# Patient Record
Sex: FEMALE | Race: BLACK OR AFRICAN AMERICAN | Employment: FULL TIME | URBAN - METROPOLITAN AREA
[De-identification: names, ages, dates, MRNs, and addresses within clinical notes are randomized per-mention and may not be internally consistent; named-entity substitution may affect disease eponyms.]

---

## 2021-02-05 ENCOUNTER — NURSE ONLY (OUTPATIENT)
Dept: PRIMARY CARE CLINIC | Age: 51
End: 2021-02-05
Payer: COMMERCIAL

## 2021-02-05 DIAGNOSIS — Z01.818 PREOP EXAMINATION: Primary | ICD-10-CM

## 2021-02-05 PROCEDURE — 99211 OFF/OP EST MAY X REQ PHY/QHP: CPT | Performed by: NURSE PRACTITIONER

## 2021-02-06 LAB — SARS-COV-2: NOT DETECTED

## 2021-02-08 RX ORDER — ATORVASTATIN CALCIUM 40 MG/1
40 TABLET, FILM COATED ORAL DAILY
COMMUNITY

## 2021-02-08 RX ORDER — DULAGLUTIDE 3 MG/.5ML
3 INJECTION, SOLUTION SUBCUTANEOUS WEEKLY
COMMUNITY

## 2021-02-08 SDOH — HEALTH STABILITY: MENTAL HEALTH: HOW OFTEN DO YOU HAVE A DRINK CONTAINING ALCOHOL?: NEVER

## 2021-02-08 NOTE — PROGRESS NOTES
ENDOSCOPY PREOP PHONE INTERVIEW  INSTRUCTIONS:  ? Covid test was 2/5. Please continue to quarantine until your procedure   ? Follow all instructions / preps given to you from your doctor's office. If you have not received these instructions yet, please call the office immediately. ? Enter the MAIN entrance located on [address] and report to the desk on left side of the lobby. Arrival Time: 0700 for your procedure scheduled at: 0830  ? Bring your insurance & photo ID with you.    ? Dress comfortably. No lotion, powders or jewelry  ? Bring an accurate list of your medications with doses/ frequency with you day of the procedure, including over the counter medications. If you are taking blood thinners, ASA or diabetic medication, make sure to call Dr. Joy Aguirre  or your PCP for instructions prior to your procedure. ? Arrange for someone to be with you and sign you out & drive you home after your procedure. ? We are allowing 1 visitor with you in the hospital.        If you have further questions, you may contact your Endoscopist's office or Pre Admission Testing staff at [phone]. 2/8/2021 .10:02 AM

## 2021-02-10 ENCOUNTER — ANESTHESIA EVENT (OUTPATIENT)
Dept: ENDOSCOPY | Age: 51
End: 2021-02-10
Payer: COMMERCIAL

## 2021-02-11 ENCOUNTER — HOSPITAL ENCOUNTER (OUTPATIENT)
Age: 51
Setting detail: OUTPATIENT SURGERY
Discharge: HOME | End: 2021-02-11
Attending: INTERNAL MEDICINE | Admitting: INTERNAL MEDICINE
Payer: COMMERCIAL

## 2021-02-11 ENCOUNTER — ANESTHESIA (OUTPATIENT)
Dept: ENDOSCOPY | Age: 51
End: 2021-02-11
Payer: COMMERCIAL

## 2021-02-11 VITALS
DIASTOLIC BLOOD PRESSURE: 96 MMHG | HEIGHT: 66 IN | WEIGHT: 287 LBS | HEART RATE: 89 BPM | TEMPERATURE: 96.5 F | RESPIRATION RATE: 18 BRPM | BODY MASS INDEX: 46.12 KG/M2 | OXYGEN SATURATION: 100 % | SYSTOLIC BLOOD PRESSURE: 137 MMHG

## 2021-02-11 VITALS
RESPIRATION RATE: 33 BRPM | OXYGEN SATURATION: 100 % | DIASTOLIC BLOOD PRESSURE: 77 MMHG | SYSTOLIC BLOOD PRESSURE: 145 MMHG

## 2021-02-11 DIAGNOSIS — Z12.11 COLON CANCER SCREENING: ICD-10-CM

## 2021-02-11 LAB
GLUCOSE BLD-MCNC: 320 MG/DL (ref 70–99)
PERFORMED ON: ABNORMAL

## 2021-02-11 PROCEDURE — 2709999900 HC NON-CHARGEABLE SUPPLY: Performed by: INTERNAL MEDICINE

## 2021-02-11 PROCEDURE — 2500000003 HC RX 250 WO HCPCS: Performed by: NURSE ANESTHETIST, CERTIFIED REGISTERED

## 2021-02-11 PROCEDURE — 7100000011 HC PHASE II RECOVERY - ADDTL 15 MIN: Performed by: INTERNAL MEDICINE

## 2021-02-11 PROCEDURE — 3700000001 HC ADD 15 MINUTES (ANESTHESIA): Performed by: INTERNAL MEDICINE

## 2021-02-11 PROCEDURE — 2580000003 HC RX 258: Performed by: ANESTHESIOLOGY

## 2021-02-11 PROCEDURE — 7100000010 HC PHASE II RECOVERY - FIRST 15 MIN: Performed by: INTERNAL MEDICINE

## 2021-02-11 PROCEDURE — 3700000000 HC ANESTHESIA ATTENDED CARE: Performed by: INTERNAL MEDICINE

## 2021-02-11 PROCEDURE — 3609010200 HC COLONOSCOPY ABLATION TUMOR POLYP/OTHER LES: Performed by: INTERNAL MEDICINE

## 2021-02-11 PROCEDURE — 88305 TISSUE EXAM BY PATHOLOGIST: CPT

## 2021-02-11 PROCEDURE — 6360000002 HC RX W HCPCS: Performed by: NURSE ANESTHETIST, CERTIFIED REGISTERED

## 2021-02-11 RX ORDER — SODIUM CHLORIDE 0.9 % (FLUSH) 0.9 %
10 SYRINGE (ML) INJECTION EVERY 12 HOURS SCHEDULED
Status: DISCONTINUED | OUTPATIENT
Start: 2021-02-11 | End: 2021-02-11 | Stop reason: HOSPADM

## 2021-02-11 RX ORDER — SODIUM CHLORIDE, SODIUM LACTATE, POTASSIUM CHLORIDE, CALCIUM CHLORIDE 600; 310; 30; 20 MG/100ML; MG/100ML; MG/100ML; MG/100ML
INJECTION, SOLUTION INTRAVENOUS CONTINUOUS
Status: DISCONTINUED | OUTPATIENT
Start: 2021-02-11 | End: 2021-02-11 | Stop reason: HOSPADM

## 2021-02-11 RX ORDER — LIDOCAINE HYDROCHLORIDE 20 MG/ML
INJECTION, SOLUTION EPIDURAL; INFILTRATION; INTRACAUDAL; PERINEURAL PRN
Status: DISCONTINUED | OUTPATIENT
Start: 2021-02-11 | End: 2021-02-11 | Stop reason: SDUPTHER

## 2021-02-11 RX ORDER — PROPOFOL 10 MG/ML
INJECTION, EMULSION INTRAVENOUS PRN
Status: DISCONTINUED | OUTPATIENT
Start: 2021-02-11 | End: 2021-02-11 | Stop reason: SDUPTHER

## 2021-02-11 RX ORDER — SODIUM CHLORIDE 0.9 % (FLUSH) 0.9 %
10 SYRINGE (ML) INJECTION PRN
Status: DISCONTINUED | OUTPATIENT
Start: 2021-02-11 | End: 2021-02-11 | Stop reason: HOSPADM

## 2021-02-11 RX ORDER — PROPOFOL 10 MG/ML
INJECTION, EMULSION INTRAVENOUS CONTINUOUS PRN
Status: DISCONTINUED | OUTPATIENT
Start: 2021-02-11 | End: 2021-02-11 | Stop reason: SDUPTHER

## 2021-02-11 RX ORDER — SODIUM CHLORIDE 9 MG/ML
INJECTION, SOLUTION INTRAVENOUS CONTINUOUS
Status: DISCONTINUED | OUTPATIENT
Start: 2021-02-11 | End: 2021-02-11 | Stop reason: HOSPADM

## 2021-02-11 RX ADMIN — LIDOCAINE HYDROCHLORIDE 100 MG: 20 INJECTION, SOLUTION EPIDURAL; INFILTRATION; INTRACAUDAL; PERINEURAL at 08:24

## 2021-02-11 RX ADMIN — PROPOFOL 20 MG: 10 INJECTION, EMULSION INTRAVENOUS at 08:28

## 2021-02-11 RX ADMIN — PROPOFOL 40 MG: 10 INJECTION, EMULSION INTRAVENOUS at 08:24

## 2021-02-11 RX ADMIN — SODIUM CHLORIDE, POTASSIUM CHLORIDE, SODIUM LACTATE AND CALCIUM CHLORIDE: 600; 310; 30; 20 INJECTION, SOLUTION INTRAVENOUS at 07:48

## 2021-02-11 RX ADMIN — PROPOFOL 20 MG: 10 INJECTION, EMULSION INTRAVENOUS at 08:36

## 2021-02-11 RX ADMIN — PROPOFOL 100 MCG/KG/MIN: 10 INJECTION, EMULSION INTRAVENOUS at 08:22

## 2021-02-11 ASSESSMENT — PULMONARY FUNCTION TESTS
PIF_VALUE: 1
PIF_VALUE: 1
PIF_VALUE: 0
PIF_VALUE: 1
PIF_VALUE: 0
PIF_VALUE: 1
PIF_VALUE: 1
PIF_VALUE: 0
PIF_VALUE: 1

## 2021-02-11 ASSESSMENT — PAIN - FUNCTIONAL ASSESSMENT: PAIN_FUNCTIONAL_ASSESSMENT: 0-10

## 2021-02-11 NOTE — ANESTHESIA PRE PROCEDURE
Department of Anesthesiology  Preprocedure Note       Name:  Russel Nath   Age:  44 y.o.  :  1970                                          MRN:  [id number]         Date:  2021      Surgeon: Marquise Ling):  Moises Haley MD    Procedure: Procedure(s):  COLONOSCOPY    Medications prior to admission:   Prior to Admission medications    Medication Sig Start Date End Date Taking? Authorizing Provider   atorvastatin (LIPITOR) 40 MG tablet Take 40 mg by mouth daily   Yes Historical Provider, MD   LISINOPRIL PO Take by mouth daily   Yes Historical Provider, MD   Dulaglutide (TRULICITY) 3 CC/0.3SM SOPN Inject 3 mg into the skin once a week [address]    Historical Provider, MD       Current medications:    Current Facility-Administered Medications   Medication Dose Route Frequency Provider Last Rate Last Admin    sodium chloride flush 0.9 % injection 10 mL  10 mL Intravenous 2 times per day Linsey Jennifer, DO        sodium chloride flush 0.9 % injection 10 mL  10 mL Intravenous PRN Linsey Jennifer, DO        0.9 % sodium chloride infusion   Intravenous Continuous Linsey Jennifer, DO        lactated ringers infusion   Intravenous Continuous Linsey Jennifer,  mL/hr at 21 0748 New Bag at 21 0748       Allergies:  No Known Allergies    Problem List:  There is no problem list on file for this patient.       Past Medical History:        Diagnosis Date    Diabetes mellitus ([address]     Hyperlipidemia     Hypertension     Sleep apnea     not using cpap        Past Surgical History:        Procedure Laterality Date    DILATION AND CURETTAGE OF UTERUS      HYSTERECTOMY      TONSILLECTOMY      TUBAL LIGATION         Social History:    Social History     Tobacco Use    Smoking status: Current Every Day Smoker     Years: 28.00     Types: Cigars    Smokeless tobacco: Never Used    Tobacco comment: filter tip cigars x 3 / day   Substance Use Topics    Alcohol use: Never Frequency: Never                                Ready to quit: Not Answered  Counseling given: Not Answered  Comment: filter tip cigars x 3 / day      Vital Signs (Current):   Vitals:    02/08/21 1011 02/11/21 0755   Pulse:  85   Temp:  96.4 °F (35.8 °C)   TempSrc:  Temporal   Weight: 287 lb (130.2 kg)    Height: 5' 6\" (1.676 m)                                               BP Readings from Last 3 Encounters:   No data found for BP       NPO Status: Time of last liquid consumption: 0600                        Time of last solid consumption: 2200                        Date of last liquid consumption: 02/11/21                        Date of last solid food consumption: 02/09/21    BMI:   Wt Readings from Last 3 Encounters:   02/08/21 287 lb (130.2 kg)     Body mass index is 46.32 kg/m².     CBC: No results found for: WBC, RBC, HGB, HCT, MCV, RDW, PLT    CMP: No results found for: NA, K, CL, CO2, BUN, CREATININE, GFRAA, AGRATIO, LABGLOM, GLUCOSE, PROT, CALCIUM, BILITOT, ALKPHOS, AST, ALT    POC Tests:   Recent Labs     02/11/21  0753   POCGLU 320*       Coags: No results found for: PROTIME, INR, APTT    HCG (If Applicable): No results found for: PREGTESTUR, PREGSERUM, HCG, HCGQUANT     ABGs: No results found for: PHART, PO2ART, QVC8JAE, QPE5MQE, BEART, T3HQCEWZ     Type & Screen (If Applicable):  No results found for: LABABO, LABRH    Drug/Infectious Status (If Applicable):  No results found for: HIV, HEPCAB    COVID-19 Screening (If Applicable):   Lab Results   Component Value Date    COVID19 Not Detected 02/05/2021         Anesthesia Evaluation  Patient summary reviewed and Nursing notes reviewed no history of anesthetic complications:   Airway: Mallampati: II  TM distance: >3 FB   Neck ROM: full  Mouth opening: > = 3 FB Dental: normal exam         Pulmonary:   (+) sleep apnea: on noncompliant,                             Cardiovascular:    (+) hypertension:, hyperlipidemia        Rhythm: regular  Rate: normal Neuro/Psych:               GI/Hepatic/Renal:             Endo/Other:    (+) Diabetespoorly controlled, , .                 Abdominal:           Vascular:                                        Anesthesia Plan      MAC     ASA 3       Induction: intravenous. MIPS: Prophylactic antiemetics administered. Anesthetic plan and risks discussed with patient. Plan discussed with CRNA.                   Tereso Calhoun DO   2/11/2021

## 2021-02-11 NOTE — ANESTHESIA POSTPROCEDURE EVALUATION
Department of Anesthesiology  Postprocedure Note    Patient: Lolita Fuchs  MRN: [de-identified]  YOB: 1970  Date of evaluation: 2/11/2021  Time:  10:28 AM     Procedure Summary     Date: 02/11/21 Room / Location: [facility]    Anesthesia Start: 6482 Anesthesia Stop: 2359    Procedure: COLONOSCOPY POLYPECTOMY ABLATION Diagnosis:       Colon cancer screening      (Colon cancer screening [Z12.11])    Surgeons: Bunny Cespedes MD Responsible Provider: Kelly Jones DO    Anesthesia Type: MAC ASA Status: 3          Anesthesia Type: MAC    Robles Phase I: Robles Score: 10    Robles Phase II: Robles Score: 10    Last vitals: Reviewed and per EMR flowsheets.        Anesthesia Post Evaluation    Patient location during evaluation: PACU  Patient participation: complete - patient participated  Level of consciousness: awake and alert  Airway patency: patent  Nausea & Vomiting: no nausea and no vomiting  Cardiovascular status: blood pressure returned to baseline  Respiratory status: acceptable  Hydration status: euvolemic

## 2021-02-11 NOTE — H&P
History and Physical / Pre-Sedation Assessment      PMHx:   Past Medical History:   Diagnosis Date    Diabetes mellitus ([facility] [address]     Hyperlipidemia     Hypertension     Sleep apnea     not using cpap        Medications:   Prior to Admission medications    Medication Sig Start Date End Date Taking? Authorizing Provider   atorvastatin (LIPITOR) 40 MG tablet Take 40 mg by mouth daily   Yes Historical Provider, MD   LISINOPRIL PO Take by mouth daily   Yes Historical Provider, MD   Dulaglutide (TRULICITY) 3 WS/3.1PY SOPN Inject 3 mg into the skin once a week [address]    Historical Provider, MD       Allergies: No Known Allergies    PSHx:   Past Surgical History:   Procedure Laterality Date    DILATION AND CURETTAGE OF UTERUS      HYSTERECTOMY      TONSILLECTOMY      TUBAL LIGATION         Social Hx:   Social History     Socioeconomic History    Marital status: Single     Spouse name: Not on file    Number of children: Not on file    Years of education: Not on file    Highest education level: Not on file   Occupational History    Not on file   Social Needs    Financial resource strain: Not on file    Food insecurity     Worry: Not on file     Inability: Not on file   [employer] needs     Medical: Not on file     Non-medical: Not on file   Tobacco Use    Smoking status: Current Every Day Smoker     Years: 28.00     Types: Cigars    Smokeless tobacco: Never Used    Tobacco comment: filter tip cigars x 3 / day   Substance and Sexual Activity    Alcohol use: Never     Frequency: Never    Drug use: Yes     Types: Marijuana     Comment: occas.      Sexual activity: Not on file   Lifestyle    Physical activity     Days per week: Not on file     Minutes per session: Not on file    Stress: Not on file   Relationships    Social connections     Talks on phone: Not on file     Gets together: Not on file     Attends Restorationist service: Not on file

## 2021-02-11 NOTE — PROGRESS NOTES
Progress Note  Date:2021       Room:Endo Pool/NONE  Patient Gisell Miller     YOB: 1970     Age:50 y.o. Subjective    Subjective:  Symptoms:  (NONE). Diet:  NPO. Activity level: Normal.    Pain:  She reports no pain. Review of Systems  Objective         Vitals Last 24 Hours:  TEMPERATURE:  Temp  Av.4 °F (35.8 °C)  Min: 96.4 °F (35.8 °C)  Max: 96.4 °F (35.8 °C)  RESPIRATIONS RANGE: No data recorded  PULSE OXIMETRY RANGE: No data recorded  PULSE RANGE: Pulse  Av  Min: 85  Max: 85  BLOOD PRESSURE RANGE: No data recorded.  ; No data recorded. I/O (24Hr): No intake or output data in the 24 hours ending 21 0811  Objective:  General Appearance:  Comfortable and well-appearing. Vital signs: (most recent): Pulse 85, temperature 96.4 °F (35.8 °C), temperature source Temporal, height 5' 6\" (1.676 m), weight 287 lb (130.2 kg), not currently breastfeeding. Output: Producing urine and producing stool. Lungs:  Normal effort. Heart: Normal rate. Abdomen: Abdomen is soft. There is no abdominal tenderness. Extremities: Normal range of motion. Pulses: Distal pulses are intact. Neurological: Patient is alert and oriented to person, place and time. Skin:  Warm. Labs/Imaging/Diagnostics    Labs:  CBC:No results for input(s): WBC, RBC, HGB, HCT, MCV, RDW, PLT in the last 72 hours. CHEMISTRIES:No results for input(s): NA, K, CL, CO2, BUN, CREATININE, GLUCOSE, PHOS, MG in the last 72 hours. Invalid input(s): CA  PT/INR:No results for input(s): PROTIME, INR in the last 72 hours. APTT:No results for input(s): APTT in the last 72 hours. LIVER PROFILE:No results for input(s): AST, ALT, BILIDIR, BILITOT, ALKPHOS in the last 72 hours.     Imaging Last 24 Hours:  Colonoscopy    Result Date: 2021  No dictation     Assessment//Plan           Assessment & Plan    Electronically signed by Kimberly Segal RN on 21 at 8:11 AM EST

## 2021-02-11 NOTE — OP NOTE
[address] [place] [address]  Colonoscopy Note    Patient: Meera Lira  : 1970     Procedure: Colonoscopy with polypectomy    Date:  2021    Surgeon:  Daphne Jones MD    Anesthesia:  MAC    Indications:  Colorectal neoplasm screening in an average risk patient    Procedure: An informed consent was obtained from the patient after explanation of indications, benefits, possible risks and complications of the procedure. The patient was then taken to the endoscopy suite, placed in the left lateral decubitus position, and the above IV anesthesia was administered. A digital rectal examination was performed and revealed negative without mass, lesions or tenderness. The Olympus CFQ-180-AL video colonoscope was placed in the patient's rectum under digital direction and advanced to the cecum. The cecum was identified by characteristic anatomy and ballottment. The prep was good. The scope was then withdrawn back through the cecum, ascending, transverse, descending and sigmoid colons. The scope was then withdrawn into the rectum and retroflexed. The scope was straightened, the colon was decompressed and the scope was withdrawn from the patient. The patient tolerated the procedure well and was taken to the PACU in good condition. Findings:    ? Cecum: normal  ? Ascending Colon: normal  ? Transverse Colon: normal  ? Descending Colon: normal  ? Sigmoid Colon: mild diverticulosis  ? Rectum: Retroflexion showed a 10 mm polyp near the anal verge. Removed using a hot snare    Estimated Blood Loss: None      Impression:    ? Rectal polyp  ? Mild diverticulosis    Recommendations:    ? Patient to call for biopsy results in 10 days. ? Repeat Colonoscopy in 3 years, unless polyp turns out not be an adenoma.     Daphne Jones, [address]  2021

## 2022-01-07 ENCOUNTER — HOSPITAL ENCOUNTER (OUTPATIENT)
Dept: MAMMOGRAPHY | Age: 52
Discharge: HOME | End: 2022-01-07
Payer: COMMERCIAL

## 2022-01-07 VITALS — WEIGHT: 270 LBS | HEIGHT: 66 IN | BODY MASS INDEX: 43.39 KG/M2

## 2022-01-07 DIAGNOSIS — Z12.31 ENCOUNTER FOR SCREENING MAMMOGRAM FOR BREAST CANCER: ICD-10-CM

## 2022-01-07 PROCEDURE — 77063 BREAST TOMOSYNTHESIS BI: CPT

## 2022-01-25 ENCOUNTER — TELEPHONE (OUTPATIENT)
Dept: WOMENS IMAGING | Age: 52
End: 2022-01-25

## 2022-02-25 ENCOUNTER — HOSPITAL ENCOUNTER (OUTPATIENT)
Dept: ULTRASOUND IMAGING | Age: 52
Discharge: HOME | End: 2022-02-25
Payer: COMMERCIAL

## 2022-02-25 ENCOUNTER — HOSPITAL ENCOUNTER (OUTPATIENT)
Dept: WOMENS IMAGING | Age: 52
Discharge: HOME | End: 2022-02-25
Payer: COMMERCIAL

## 2022-02-25 DIAGNOSIS — R92.8 ABNORMAL MAMMOGRAM: ICD-10-CM

## 2022-02-25 DIAGNOSIS — R92.8 OTHER ABNORMAL AND INCONCLUSIVE FINDINGS ON DIAGNOSTIC IMAGING OF BREAST: ICD-10-CM

## 2022-02-25 PROCEDURE — G0279 TOMOSYNTHESIS, MAMMO: HCPCS

## 2022-02-25 PROCEDURE — 76641 ULTRASOUND BREAST COMPLETE: CPT

## 2023-06-08 LAB — GLUCOSE BLD-MCNC: 96 MG/DL (ref 70–100)

## 2023-09-11 ENCOUNTER — TELEPHONE (OUTPATIENT)
Dept: WOMENS IMAGING | Age: 53
End: 2023-09-11

## 2023-09-15 ENCOUNTER — HOSPITAL ENCOUNTER (OUTPATIENT)
Dept: WOMENS IMAGING | Age: 53
Discharge: HOME | End: 2023-09-15
Payer: COMMERCIAL

## 2023-09-15 VITALS — BODY MASS INDEX: 39.86 KG/M2 | HEIGHT: 66 IN | WEIGHT: 248 LBS

## 2023-09-15 DIAGNOSIS — Z13.9 ENCOUNTER FOR SCREENING: ICD-10-CM

## 2023-09-15 PROCEDURE — 77063 BREAST TOMOSYNTHESIS BI: CPT

## 2023-09-18 ENCOUNTER — TELEPHONE (OUTPATIENT)
Dept: SURGERY | Age: 53
End: 2023-09-18

## 2023-09-18 NOTE — TELEPHONE ENCOUNTER
[facility]. Left message to get scheduled for high risk new patient appointment with Bucky Lee. If patient returns call get her scheduled next available.

## 2023-12-05 ENCOUNTER — TELEPHONE (OUTPATIENT)
Dept: SURGERY | Age: 53
End: 2023-12-05

## 2023-12-05 NOTE — TELEPHONE ENCOUNTER
Left VM for patient to return call to review intake and confirm appointment for 12/6/23 at 1:30 pm in our [facility] office. Also asked patient to arrive 15 minutes before appointment time if unavailable to return call.

## 2024-01-19 ENCOUNTER — TELEPHONE (OUTPATIENT)
Dept: SURGERY | Age: 54
End: 2024-01-19

## 2024-01-24 ENCOUNTER — TELEPHONE (OUTPATIENT)
Dept: SURGERY | Age: 54
End: 2024-01-24

## 2024-01-24 ENCOUNTER — OFFICE VISIT (OUTPATIENT)
Dept: SURGERY | Age: 54
End: 2024-01-24
Payer: COMMERCIAL

## 2024-01-24 VITALS
HEART RATE: 80 BPM | RESPIRATION RATE: 18 BRPM | WEIGHT: 261.8 LBS | BODY MASS INDEX: 42.07 KG/M2 | OXYGEN SATURATION: 98 % | HEIGHT: 66 IN

## 2024-01-24 DIAGNOSIS — Z80.3 FAMILY HISTORY OF BREAST CANCER: ICD-10-CM

## 2024-01-24 DIAGNOSIS — Z91.89 AT HIGH RISK FOR BREAST CANCER: Primary | ICD-10-CM

## 2024-01-24 DIAGNOSIS — G89.29 CHRONIC PAIN OF BOTH KNEES: ICD-10-CM

## 2024-01-24 DIAGNOSIS — Z12.39 ENCOUNTER FOR SCREENING BREAST EXAMINATION: ICD-10-CM

## 2024-01-24 DIAGNOSIS — M25.561 CHRONIC PAIN OF BOTH KNEES: ICD-10-CM

## 2024-01-24 DIAGNOSIS — Z12.39 BREAST CANCER SCREENING, HIGH RISK PATIENT: ICD-10-CM

## 2024-01-24 DIAGNOSIS — M25.552 BILATERAL HIP PAIN: ICD-10-CM

## 2024-01-24 DIAGNOSIS — M25.562 CHRONIC PAIN OF BOTH KNEES: ICD-10-CM

## 2024-01-24 DIAGNOSIS — M25.551 BILATERAL HIP PAIN: ICD-10-CM

## 2024-01-24 PROCEDURE — G8417 CALC BMI ABV UP PARAM F/U: HCPCS | Performed by: NURSE PRACTITIONER

## 2024-01-24 PROCEDURE — G8427 DOCREV CUR MEDS BY ELIG CLIN: HCPCS | Performed by: NURSE PRACTITIONER

## 2024-01-24 PROCEDURE — G8484 FLU IMMUNIZE NO ADMIN: HCPCS | Performed by: NURSE PRACTITIONER

## 2024-01-24 PROCEDURE — 4004F PT TOBACCO SCREEN RCVD TLK: CPT | Performed by: NURSE PRACTITIONER

## 2024-01-24 PROCEDURE — 3017F COLORECTAL CA SCREEN DOC REV: CPT | Performed by: NURSE PRACTITIONER

## 2024-01-24 PROCEDURE — 99204 OFFICE O/P NEW MOD 45 MIN: CPT | Performed by: NURSE PRACTITIONER

## 2024-01-24 RX ORDER — SEMAGLUTIDE 1.34 MG/ML
INJECTION, SOLUTION SUBCUTANEOUS
COMMUNITY
Start: 2024-01-22

## 2024-01-24 RX ORDER — OLMESARTAN MEDOXOMIL 40 MG/1
TABLET ORAL
COMMUNITY
Start: 2023-11-30

## 2024-01-24 RX ORDER — AMLODIPINE BESYLATE 10 MG/1
10 TABLET ORAL
COMMUNITY
Start: 2022-04-12

## 2024-01-24 NOTE — PATIENT INSTRUCTIONS
[employer] [facility]    [employer]  [address]  [address]  [place] [address]  [phone]  Cert[employer] and works with [employer].  [id number]  Th-Sat 12PM-6PM  Sun 3PM-6PM      [place] at [address] [address]  [phone]  Certified [employer], does not order prothesis  Hours are varied due to staffing, call ahead before going.      [place] at [address] [address]  [phone]  Certified [employer], does not order prothesis  Hours are varied due to staffing, call ahead before going.      [place]se at [address] [address]  [phone]  Certified [employer], does not order prothesis  Hours are varied due to staffing, call ahead before going.      [employer] [facility]     [place] [facility]  [address]  [address]  [place] [address]  [phone]  Certified [employer], does not order prothesis  S-10PM-5PM  M-Sat 10AM-8PM      Jesse [facility]  [address], [place] [id number] [phone]  Certified [employer], does not order prothesis  S-12PM-6PM  [id number]  [id number]

## 2024-01-24 NOTE — TELEPHONE ENCOUNTER
Left message informing patient of her OV and mammogram that is scheduled for 09/24/24 mammogram @1:30 and OV with Dianne @2:30 in FF.     If date and time does not work for patient please offer new date and time.

## 2024-02-01 DIAGNOSIS — Z80.3 FAMILY HISTORY OF BREAST CANCER: ICD-10-CM

## 2024-02-01 DIAGNOSIS — Z91.89 AT HIGH RISK FOR BREAST CANCER: Primary | ICD-10-CM

## 2024-02-01 DIAGNOSIS — Z12.39 BREAST CANCER SCREENING, HIGH RISK PATIENT: ICD-10-CM

## 2024-02-02 ENCOUNTER — TELEPHONE (OUTPATIENT)
Dept: WOMENS IMAGING | Age: 54
End: 2024-02-02

## 2024-02-12 ENCOUNTER — TELEPHONE (OUTPATIENT)
Dept: WOMENS IMAGING | Age: 54
End: 2024-02-12

## 2024-02-12 NOTE — TELEPHONE ENCOUNTER
Unable to leave message; busy signal to schedule genetic counseling and general information about appt and testing.

## 2024-02-21 ENCOUNTER — TELEPHONE (OUTPATIENT)
Dept: WOMENS IMAGING | Age: 54
End: 2024-02-21

## 2024-03-13 ENCOUNTER — TELEPHONE (OUTPATIENT)
Dept: WOMENS IMAGING | Age: 54
End: 2024-03-13

## 2024-03-13 NOTE — TELEPHONE ENCOUNTER
NN LVM regarding genetic counseling scheduling.  Navigators have attempted to reach patient on 2/2, 2/7, 2/12, 2/21 and 3/13.  Advised in VM that this was our last attempt and to reach out when she is ready to schedule.  Advised Dianne Trujillo.

## 2024-03-26 ENCOUNTER — HOSPITAL ENCOUNTER (OUTPATIENT)
Dept: MRI IMAGING | Age: 54
Discharge: HOME | End: 2024-03-26
Payer: COMMERCIAL

## 2024-03-26 DIAGNOSIS — Z91.89 AT HIGH RISK FOR BREAST CANCER: ICD-10-CM

## 2024-03-26 DIAGNOSIS — Z80.3 FAMILY HISTORY OF BREAST CANCER: ICD-10-CM

## 2024-03-26 LAB
CREAT SERPL-MCNC: 0.8 MG/DL (ref 0.6–1.1)
GFR SERPLBLD CREATININE-BSD FMLA CKD-EPI: 87 ML/MIN/{1.73_M2}

## 2024-03-26 PROCEDURE — A9577 INJ MULTIHANCE: HCPCS | Performed by: NURSE PRACTITIONER

## 2024-03-26 PROCEDURE — 36415 COLL VENOUS BLD VENIPUNCTURE: CPT

## 2024-03-26 PROCEDURE — 6360000004 HC RX CONTRAST MEDICATION: Performed by: NURSE PRACTITIONER

## 2024-03-26 PROCEDURE — C8908 MRI W/O FOL W/CONT, BREAST,: HCPCS

## 2024-03-26 PROCEDURE — 82565 ASSAY OF CREATININE: CPT

## 2024-03-26 RX ADMIN — GADOBENATE DIMEGLUMINE 20 ML: 529 INJECTION, SOLUTION INTRAVENOUS at 14:51

## 2024-04-09 ENCOUNTER — TELEPHONE (OUTPATIENT)
Dept: SURGERY | Age: 54
End: 2024-04-09

## 2024-04-09 NOTE — TELEPHONE ENCOUNTER
----- Message from SHY Robb CNP sent at 3/28/2024  1:36 PM EDT -----  Please let patient know, MRI looks good, no new suspicious or concerning findings suggestive of malignancy.

## 2024-04-12 ENCOUNTER — TELEPHONE (OUTPATIENT)
Dept: SURGERY | Age: 54
End: 2024-04-12

## 2025-06-06 NOTE — TELEPHONE ENCOUNTER
imaging Navigator LMTCB about referall for genetic counseling and general information about appt and testing.    
Patient/Caregiver provided printed discharge information.
Unknown

## (undated) DEVICE — SINGLE-USE POLYPECTOMY SNARE: Brand: CAPTIFLEX

## (undated) DEVICE — CANNULA SAMP CO2 AD GRN 7FT CO2 AND 7FT O2 TBNG UNIV CONN

## (undated) DEVICE — ERBE NESSY®PLATE 170 SPLIT; 168CM²; CABLE 3M: Brand: ERBE